# Patient Record
Sex: FEMALE | Race: WHITE | NOT HISPANIC OR LATINO | Employment: OTHER | URBAN - METROPOLITAN AREA
[De-identification: names, ages, dates, MRNs, and addresses within clinical notes are randomized per-mention and may not be internally consistent; named-entity substitution may affect disease eponyms.]

---

## 2024-08-26 DIAGNOSIS — D32.9 MENINGIOMA: Primary | ICD-10-CM

## 2024-09-19 ENCOUNTER — OFFICE VISIT (OUTPATIENT)
Dept: NEUROLOGY | Facility: CLINIC | Age: 79
End: 2024-09-19
Payer: MEDICARE

## 2024-09-19 VITALS
HEIGHT: 63 IN | SYSTOLIC BLOOD PRESSURE: 124 MMHG | WEIGHT: 153 LBS | BODY MASS INDEX: 27.11 KG/M2 | DIASTOLIC BLOOD PRESSURE: 76 MMHG

## 2024-09-19 DIAGNOSIS — R51.9 NONINTRACTABLE EPISODIC HEADACHE, UNSPECIFIED HEADACHE TYPE: ICD-10-CM

## 2024-09-19 DIAGNOSIS — H93.19 TINNITUS, UNSPECIFIED LATERALITY: ICD-10-CM

## 2024-09-19 DIAGNOSIS — R51.9 CHRONIC DAILY HEADACHE: ICD-10-CM

## 2024-09-19 DIAGNOSIS — D32.9 MENINGIOMA: Primary | ICD-10-CM

## 2024-09-19 PROCEDURE — 1159F MED LIST DOCD IN RCRD: CPT | Mod: CPTII,S$GLB,, | Performed by: SPECIALIST

## 2024-09-19 PROCEDURE — 1160F RVW MEDS BY RX/DR IN RCRD: CPT | Mod: CPTII,S$GLB,, | Performed by: SPECIALIST

## 2024-09-19 PROCEDURE — 99999 PR PBB SHADOW E&M-EST. PATIENT-LVL III: CPT | Mod: PBBFAC,,, | Performed by: SPECIALIST

## 2024-09-19 PROCEDURE — 99204 OFFICE O/P NEW MOD 45 MIN: CPT | Mod: S$GLB,,, | Performed by: SPECIALIST

## 2024-09-19 PROCEDURE — 3074F SYST BP LT 130 MM HG: CPT | Mod: CPTII,S$GLB,, | Performed by: SPECIALIST

## 2024-09-19 PROCEDURE — 1101F PT FALLS ASSESS-DOCD LE1/YR: CPT | Mod: CPTII,S$GLB,, | Performed by: SPECIALIST

## 2024-09-19 PROCEDURE — 3288F FALL RISK ASSESSMENT DOCD: CPT | Mod: CPTII,S$GLB,, | Performed by: SPECIALIST

## 2024-09-19 PROCEDURE — 3078F DIAST BP <80 MM HG: CPT | Mod: CPTII,S$GLB,, | Performed by: SPECIALIST

## 2024-09-19 RX ORDER — LEVOTHYROXINE SODIUM 50 UG/1
50 TABLET ORAL EVERY MORNING
COMMUNITY

## 2024-09-19 RX ORDER — ATORVASTATIN CALCIUM 40 MG/1
40 TABLET, FILM COATED ORAL NIGHTLY
COMMUNITY

## 2024-09-19 RX ORDER — PANTOPRAZOLE SODIUM 40 MG/1
40 TABLET, DELAYED RELEASE ORAL DAILY
COMMUNITY

## 2024-09-19 RX ORDER — AMLODIPINE BESYLATE 10 MG/1
10 TABLET ORAL DAILY
COMMUNITY

## 2024-09-19 RX ORDER — LATANOPROST 50 UG/ML
1 SOLUTION/ DROPS OPHTHALMIC NIGHTLY
COMMUNITY

## 2024-09-19 RX ORDER — HYDROCHLOROTHIAZIDE 25 MG/1
25 TABLET ORAL EVERY MORNING
COMMUNITY

## 2024-09-19 NOTE — PROGRESS NOTES
"  Neurology Note    Subjective:       Patient ID: Venita Miranda is a 79 y.o. female.    Chief Complaint: New pt for meningioma eval     HPI:            Pt presents for meningioma eval  Pt reports onset of headaches last year (about 9-10 months ago); current daily frontal "low grade" headaches. Rated 3/10. Intermittent sharp pain in L temporal area lasting few seconds. She occasionally takes Excedrin for headaches and it does help; doesn't take this often. She is not particularly troubled with headaches and states she does not want to take a daily medication as headache prevention.   Also has buzzing in ears when laying down rated as 5/10 (for the past several months). Permanent floaters to R eye after using prednisolone last yr. Last seen by ophthalmology 2 months ago. Denies nausea/vomiting. Occasionally off-balance. No bladder trouble. MRI Brain completed in [place], report under imaging. Lives alone.      ROS: as per HPI, otherwise pertinent systems review is negative          Past Medical History:   Diagnosis Date    Hypertension        Past Surgical History:   Procedure Laterality Date    TUBAL LIGATION         Family History   Family history unknown: Yes       Social History     Socioeconomic History    Marital status: Unknown   Tobacco Use    Smoking status: Never    Smokeless tobacco: Never   Substance and Sexual Activity    Alcohol use: Never    Drug use: Never       Review of patient's allergies indicates:   Allergen Reactions    Lisinopril Hives    Baclofen     Prednisolone acetate        Current Outpatient Medications   Medication Instructions    amLODIPine (NORVASC) 10 mg, Oral, Daily    atorvastatin (LIPITOR) 40 mg, Oral, Nightly    hydroCHLOROthiazide (HYDRODIURIL) 25 mg, Oral, Every morning    latanoprost 0.005 % ophthalmic solution 1 drop, Both Eyes, Nightly    levothyroxine (SYNTHROID) 50 mcg, Oral, Every morning    pantoprazole (PROTONIX) 40 mg, Oral, Daily     Objective:      Exam:   Visit " "Vitals  /76 (BP Location: Left arm, Patient Position: Sitting)   Ht 5' 3" (1.6 m)   Wt 69.4 kg (153 lb)   BMI 27.10 kg/m²       Physical Exam  Constitutional: Appearance: No acute distress, well developed & well nourished  HENT: Head: normocephalic/atraumatic  Mallampati 3   Eyes: Conjunctiva clear, non-icteric  Fundoscopic exam:  no obvious papilledema  Cardiovascular: Regular rate and rhythm, no murmur  Pulmonary:  Pulmonary effort is normal  Musculoskeletal: General: Normal range of motion  Skin: Skin is warm and dry, no obvious lesions  Peripheral extremities: no edema  Psychiatric: mood and affect normal. Behavior normal, cooperative     Neuro exam:    Mental status:  The patient is alert and oriented to person, place and time.  Language is intact and fluent  Normal attention and concentration  Cranial Nerves:  Pupils are equal, round, and reactive to light   EOM intact, no nystagmus, no ptosis, no gaze preference or deviation    Visual fields are full to confrontation testing  Facial movement is symmetric, normal cheek puff and smile  Hearing is intact               Tongue protrudes midline  Coordination:     Finger to nose - normal and symmetric bilaterally  No dysmetria  Motor:  Normal muscle bulk and symmetry  Strength grossly normal  No lateralizing features  Tone:   Normal, no ankle clonus  Gait:  Normal gait, unassisted  Romberg - negative  Tandem, Heel, and Toe Walk - able to perform without difficulty  Sensory:   Intact to vibration in all extremities  Reflexes: + BR, AJ, KJ  Plantars: silent  Tremor: none    Review of Data:   Prior notes reviewed     Imaging:  MRI brain w wo from [facility] 2/24 reviewed.         Assessment/Plan:   1. Management of meningioma    2. Tinnitus, unspecified laterality    3. Chronic daily headache    4. Nonintractable episodic headache, unspecified headache type    Will obtain CD from [facility] to view images.   She does not wish to add a daily preventive headache " medication at this time.     Follow-up: we will call her after viewing images to discuss next steps.     Dr. Dove physically present in exam room during patient encounter.   I, Laila Frausto NP acted solely as a scribe for and in the presence of Dr. Dove who performed the service.    Laila Frausto, MSN, APRN, FNP-C  [facility]  [phone]

## 2025-07-24 ENCOUNTER — HOSPITAL ENCOUNTER (OUTPATIENT)
Dept: RADIOLOGY | Facility: CLINIC | Age: 80
Discharge: HOME | End: 2025-07-24
Attending: ORTHOPAEDIC SURGERY
Payer: MEDICARE

## 2025-07-24 ENCOUNTER — OFFICE VISIT (OUTPATIENT)
Dept: ORTHOPEDICS | Facility: CLINIC | Age: 80
End: 2025-07-24
Payer: MEDICARE

## 2025-07-24 VITALS
WEIGHT: 146.63 LBS | SYSTOLIC BLOOD PRESSURE: 159 MMHG | DIASTOLIC BLOOD PRESSURE: 96 MMHG | HEART RATE: 101 BPM | HEIGHT: 63 IN | BODY MASS INDEX: 25.98 KG/M2

## 2025-07-24 DIAGNOSIS — M25.512 BILATERAL SHOULDER PAIN, UNSPECIFIED CHRONICITY: ICD-10-CM

## 2025-07-24 DIAGNOSIS — M25.511 BILATERAL SHOULDER PAIN, UNSPECIFIED CHRONICITY: ICD-10-CM

## 2025-07-24 DIAGNOSIS — M48.02 CERVICAL SPINAL STENOSIS: Primary | ICD-10-CM

## 2025-07-24 DIAGNOSIS — M54.12 CERVICAL RADICULOPATHY: ICD-10-CM

## 2025-07-24 PROCEDURE — 73030 X-RAY EXAM OF SHOULDER: CPT | Mod: 50,,, | Performed by: ORTHOPAEDIC SURGERY

## 2025-07-24 RX ORDER — BETAMETHASONE SODIUM PHOSPHATE AND BETAMETHASONE ACETATE 3; 3 MG/ML; MG/ML
6 INJECTION, SUSPENSION INTRA-ARTICULAR; INTRALESIONAL; INTRAMUSCULAR; SOFT TISSUE
Status: DISCONTINUED | OUTPATIENT
Start: 2025-07-24 | End: 2025-07-24 | Stop reason: HOSPADM

## 2025-07-24 RX ORDER — LIDOCAINE HYDROCHLORIDE 20 MG/ML
2 INJECTION, SOLUTION INFILTRATION; PERINEURAL
Status: DISCONTINUED | OUTPATIENT
Start: 2025-07-24 | End: 2025-07-24 | Stop reason: HOSPADM

## 2025-07-24 RX ADMIN — BETAMETHASONE SODIUM PHOSPHATE AND BETAMETHASONE ACETATE 6 MG: 3; 3 INJECTION, SUSPENSION INTRA-ARTICULAR; INTRALESIONAL; INTRAMUSCULAR; SOFT TISSUE at 02:07

## 2025-07-24 RX ADMIN — LIDOCAINE HYDROCHLORIDE 2 MG: 20 INJECTION, SOLUTION INFILTRATION; PERINEURAL at 02:07

## 2025-07-24 NOTE — PROCEDURES
Large Joint Aspiration/Injection: R subacromial bursa    Date/Time: 7/24/2025 2:00 PM    Performed by: Rhina Irizarry FNP  Authorized by: Adams Person MD    Timeout: prior to procedure the correct patient, procedure, and site was verified    Prep: patient was prepped and draped in usual sterile fashion      Details:  Needle Size:  25 G  Approach:  Posterior  Location:  Shoulder  Site:  R subacromial bursa  Medications:  2 mg LIDOcaine HCL 20 mg/ml (2%) 20 mg/mL (2 %); 6 mg betamethasone acetate-betamethasone sodium phosphate 6 mg/mL  Patient tolerance:  Patient tolerated the procedure well with no immediate complications

## 2025-07-24 NOTE — PROGRESS NOTES
Subjective:      Patient ID: Venita Miranda is a 79 y.o. female.    Chief Complaint: Shoulder Injury (Ongoing pain for a couple months. Pain radiates from shoulder into elbows. Has tingling at times from shoulders. Is going to PT and noticed pain then. RT shoulder is worse than LT shoulder. Is taking tylenol and alternating with ibuprofen which as helped with pain. Has had an injection years ago in the rt shoulder which helped with the pain. )    HPI:     History of Present Illness    CHIEF COMPLAINT:  - Bilateral shoulder pain with difficulty lifting arms, right side more symptomatic.    HPI:  Venita presents with bilateral shoulder pain that started in May when she began PT. Pain is described as excruciating and radiates down to her upper arms, with associated muscle pain. She reports severe pain radiating to her hands during a specific exercise where she stood in a doorway with her hands raised. Despite the pain, she continued the exercise as instructed but eventually stopped. Pain persists and is now accompanied by difficulty lifting her arms. She experiences pain at night while sleeping, predominantly on the right side.    She has a history of a previous right arm injury from overuse at work, which was treated with an injection and PT, resulting in a full recovery within a few weeks. She also reports a history of neck issues, for which her primary care physician prescribed daily exercises to manage pain in her head. She notes that during recent XRs, raising her arm was particularly painful.    She mentions a history of constant headaches, for which she had previously undergone an MRI of the brain. She denies any numbness or tingling in the arms or hands.    PREVIOUS TREATMENTS:  - PT: Started in May for shoulder issues. One exercise involving standing in a doorway with hands up caused severe pain, but she continued as instructed.  - Injection: Received for right arm pain in the past, followed by PT. This provided  "100% relief within a few weeks.  - Neck exercises: Performed at home daily, which help with head pain.    IMAGING:  - XR Shoulders: Shoulders appeared normal without arthritic changes.  - XR C Spine: Showed significant arthritis.  - MRI Brain: Performed in the past due to constant headaches, but did not include imaging of the neck.    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, which led to overuse injury of the right arm  - She received an injection and PT for this injury, and was back to 100% within a few weeks      ROS:  Head: +headaches  Musculoskeletal: +joint pain, +limb pain, +muscle pain, +nightime pain          Past Medical History:   Diagnosis Date    Hypertension      Past Surgical History:   Procedure Laterality Date    TUBAL LIGATION       Social History[1]    Current Medications[2]  Review of patient's allergies indicates:   Allergen Reactions    Lisinopril Hives    Baclofen        BP (!) 159/96   Pulse 101   Ht 5' 3" (1.6 m)   Wt 66.5 kg (146 lb 9.6 oz)   BMI 25.97 kg/m²     Comprehensive review of systems completed and negative except as per HPI.        Objective:   General: Well-developed, well-nourished.  Neuro: Alert and oriented x 3.  Psych: Normal mood and affect.  Card: Regular rate and rhythm  Resp: Respirations regular and unlabored    Neck Exam:    No deformity. No tenderness to palpation along the spine. No step off. 4/5 strength right upper extremities. 4/5 strength left upper extremity. Positive upper tract signs. Hyper reflexes. Positive clonus. Normal skin appearance. Sensibility normal.      Assessment:         1. Cervical spinal stenosis    2. Cervical radiculopathy          Plan:       Orders Placed This Encounter    Large Joint Aspiration/Injection: R subacromial bursa    X-Ray Shoulder Complete Bilateral        Imaging and exam findings discussed.     Assessment & Plan    PROCEDURES:  - Right shoulder corticosteroid " injection administered today.  This will be therapeutic and diagnostic.  This patient may actually have some shoulder pathology as well.  I just think that based on her x-rays he of the cervical spine she more than likely has cervical issues causing bilateral shoulder pain.    IMAGING:  - Ordered MRI C spine to evaluate for nerve impingement and further assess arthritis.  If her MRI of the cervical spine does not show any significant pathology, then we will investigate the shoulders further with possible MRIs.    FOLLOW UP:  - Follow up after MRI to review results and determine next steps.         Large Joint Aspiration/Injection: R subacromial bursa    Date/Time: 7/24/2025 2:00 PM    Performed by: Rhina Irizarry FNP  Authorized by: Adams Person MD    Timeout: prior to procedure the correct patient, procedure, and site was verified    Prep: patient was prepped and draped in usual sterile fashion      Details:  Needle Size:  25 G  Approach:  Posterior  Location:  Shoulder  Site:  R subacromial bursa  Medications:  2 mg LIDOcaine HCL 20 mg/ml (2%) 20 mg/mL (2 %); 6 mg betamethasone acetate-betamethasone sodium phosphate 6 mg/mL  Patient tolerance:  Patient tolerated the procedure well with no immediate complications       All questions were answered. Patient happy and in agreement with the plan.     This note was generated with the assistance of ambient listening technology. Verbal consent was obtained by the patient and accompanying visitor(s) for the recording of patient appointment to facilitate this note. I attest to having reviewed and edited the generated note for accuracy, though some syntax or spelling errors may persist. Please contact the author of this note for any clarification.         [1]   Social History  Socioeconomic History    Marital status: Unknown   Tobacco Use    Smoking status: Never    Smokeless tobacco: Never   Substance and Sexual Activity    Alcohol use: Never    Drug use: Never      Social Drivers of Health     Financial Resource Strain: Low Risk  (1/17/2025)    [facility]    Overall Financial Resource Strain (CARDIA)     Difficulty of Paying Living Expenses: Not very hard   Food Insecurity: No Food Insecurity (1/17/2025)    [facility]    Hunger Vital Sign     Worried About Running Out of Food in the Last Year: Never true     Ran Out of Food in the Last Year: Never true   Transportation Needs: No Transportation Needs (1/17/2025)    [facility]    PRAPARE - Transportation     Lack of Transportation (Medical): No     Lack of Transportation (Non-Medical): No   Physical Activity: Insufficiently Active (1/17/2025)    [facility]    Exercise Vital Sign     Days of Exercise per Week: 3 days     Minutes of Exercise per Session: 30 min   Stress: No Stress Concern Present (1/17/2025)    [facility]    [place] [facility] [employer] - Occupational Stress Questionnaire     Feeling of Stress : Only a little   Housing Stability: High Risk (1/17/2025)    [facility]    Housing Stability Vital Sign     Unable to Pay for Housing in the Last Year: No     Number of Times Moved in the Last Year: 2     Homeless in the Last Year: No   [2]   Current Outpatient Medications:     amLODIPine (NORVASC) 10 MG tablet, Take 10 mg by mouth once daily., Disp: , Rfl:     atorvastatin (LIPITOR) 40 MG tablet, Take 40 mg by mouth every evening., Disp: , Rfl:     hydroCHLOROthiazide (HYDRODIURIL)  25 MG tablet, Take 25 mg by mouth every morning., Disp: , Rfl:     latanoprost 0.005 % ophthalmic solution, Place 1 drop into both eyes every evening., Disp: , Rfl:     levothyroxine (SYNTHROID) 50 MCG tablet, Take 50 mcg by mouth every morning., Disp: , Rfl:     pantoprazole (PROTONIX) 40 MG tablet, Take 40 mg by mouth once daily., Disp: , Rfl:

## 2025-08-07 ENCOUNTER — OFFICE VISIT (OUTPATIENT)
Dept: ORTHOPEDICS | Facility: CLINIC | Age: 80
End: 2025-08-07
Payer: MEDICARE

## 2025-08-07 VITALS
HEART RATE: 88 BPM | SYSTOLIC BLOOD PRESSURE: 126 MMHG | DIASTOLIC BLOOD PRESSURE: 82 MMHG | BODY MASS INDEX: 25.98 KG/M2 | WEIGHT: 146.63 LBS | HEIGHT: 63 IN

## 2025-08-07 DIAGNOSIS — M54.12 CERVICAL RADICULOPATHY: ICD-10-CM

## 2025-08-07 DIAGNOSIS — M48.02 CERVICAL SPINAL STENOSIS: Primary | ICD-10-CM

## 2025-08-07 RX ORDER — GABAPENTIN 100 MG/1
100 CAPSULE ORAL 3 TIMES DAILY
Qty: 90 CAPSULE | Refills: 2 | Status: SHIPPED | OUTPATIENT
Start: 2025-08-07 | End: 2026-08-07

## 2025-08-07 RX ORDER — MELOXICAM 7.5 MG/1
7.5 TABLET ORAL DAILY
Qty: 30 TABLET | Refills: 2 | Status: SHIPPED | OUTPATIENT
Start: 2025-08-07

## 2025-08-07 NOTE — PROGRESS NOTES
Subjective:      Patient ID: Venita Miradna is a 80 y.o. female.    Chief Complaint: Results (Patient is here for MRI of cervical spine done 8/1/25. )    HPI:     History of Present Illness    CHIEF COMPLAINT:  - C spine issues with neck pain, headaches, and arm symptoms.    HPI:  Venita presents for follow-up regarding C spine issues. She reports neck pain, headaches, and pain in the back of her head for two years. On March 31st, she went to the emergency room due to sudden onset of all symptoms, including tingling and numbness in her hands and feet. She states that the condition is manageable, but occasionally becomes very painful due to the combination of headache, neck pain, and pain in the back of the head. Pain limits her ability to go upstairs. Her shoulders ache but are better than before. She expresses dissatisfaction with previous neurologist visits, stating that the neurologist made no attempt to help her. An MRI of her C spine showed areas of concern correlating with her arm symptoms. She also mentions having reflux.    She denies any tumor causing her symptoms. She denies taking any blood thinners.    PREVIOUS TREATMENTS:  - Neck traction: Initially provided no relief, but caused significant pain in the neck later that evening. She reports no immediate effects, but later that evening, the neck pain became severe.    IMAGING:  - MRI C spine: Findings show disk collapse and bulging, putting pressure on the spinal cord.  - XR C spine: Ordered by the patient's primary care physician.    MEDICATIONS:  - Lidocaine spray: As needed (topical)  - Pantoprazole: For reflux  - Gabapentin or similar medication: Previously used      ROS:  Head: +headaches  Gastrointestinal: +heartburn, +indigestion  Musculoskeletal: +joint pain, +neck pain, +pain with movement, +exercise intolerance  Neurological: +numbness, +tingling          Past Medical History:   Diagnosis Date    Hypertension      Past Surgical History:   Procedure  "Laterality Date    TUBAL LIGATION       Social History[1]    Current Medications[2]  Review of patient's allergies indicates:   Allergen Reactions    Lisinopril Hives    Baclofen        /82   Pulse 88   Ht 5' 3" (1.6 m)   Wt 66.5 kg (146 lb 9.7 oz)   BMI 25.97 kg/m²     Comprehensive review of systems completed and negative except as per HPI.        Objective:   General: Well-developed, well-nourished.  Neuro: Alert and oriented x 3.  Psych: Normal mood and affect.  Card: Regular rate and rhythm  Resp: Respirations regular and unlabored    Exam unchanged from previous visit.      Assessment:         1. Cervical spinal stenosis    2. Cervical radiculopathy          Plan:       Orders Placed This Encounter    Ambulatory referral/consult to Pain Clinic    meloxicam (MOBIC) 7.5 MG tablet    gabapentin (NEURONTIN) 100 MG capsule        Imaging and exam findings discussed.     Assessment & Plan    PROCEDURES:  - Discussed potential future non-operative spine treatments including neck traction and injections.  - Mentioned surgery as a possible future option if non-operative treatments are ineffective.    MEDICATIONS:  - Started Mobic 7.5 mg for arthritis pain.  - Started Neurontin (gabapentin) 100 mg TID for nerve pain.    REFERRALS:  - Referred to Dr. Monique for non-operative spine.    PATIENT INSTRUCTIONS:  - Apply Voltaren gel.               All questions were answered. Patient happy and in agreement with the plan.     This note was generated with the assistance of ambient listening technology. Verbal consent was obtained by the patient and accompanying visitor(s) for the recording of patient appointment to facilitate this note. I attest to having reviewed and edited the generated note for accuracy, though some syntax or spelling errors may persist. Please contact the author of this note for any clarification.         [1]   Social History  Socioeconomic History    Marital status: Unknown   Tobacco Use    " Smoking status: Never    Smokeless tobacco: Never   Substance and Sexual Activity    Alcohol use: Never    Drug use: Never     Social Drivers of Health     Financial Resource Strain: Low Risk  (1/17/2025)    [facility]    Overall Financial Resource Strain (CARDIA)     Difficulty of Paying Living Expenses: Not very hard   Food Insecurity: No Food Insecurity (1/17/2025)    [facility]    Hunger Vital Sign     Worried About Running Out of Food in the Last Year: Never true     Ran Out of Food in the Last Year: Never true   Transportation Needs: No Transportation Needs (1/17/2025)    [facility]    PRAPARE - Transportation     Lack of Transportation (Medical): No     Lack of Transportation (Non-Medical): No   Physical Activity: Insufficiently Active (1/17/2025)    [facility]    Exercise Vital Sign     Days of Exercise per Week: 3 days     Minutes of Exercise per Session: 30 min   Stress: No Stress Concern Present (1/17/2025)    [facility]    [place] of [employer]ational [employer] - Occupational Stress Questionnaire     Feeling of Stress : Only a little   Housing Stability: High Risk (1/17/2025)    [facility]    Housing Stability Vital Sign     Unable to Pay for Housing in the Last Year: No     Number of Times Moved in the Last Year: 2     Homeless in the Last Year: No   [2]   Current Outpatient Medications:     amLODIPine (NORVASC) 10 MG tablet, Take 10 mg by mouth once daily., Disp: , Rfl:      atorvastatin (LIPITOR) 40 MG tablet, Take 40 mg by mouth every evening., Disp: , Rfl:     hydroCHLOROthiazide (HYDRODIURIL) 25 MG tablet, Take 25 mg by mouth every morning., Disp: , Rfl:     latanoprost 0.005 % ophthalmic solution, Place 1 drop into both eyes every evening., Disp: , Rfl:     levothyroxine (SYNTHROID) 50 MCG tablet, Take 50 mcg by mouth every morning., Disp: , Rfl:     pantoprazole (PROTONIX) 40 MG tablet, Take 40 mg by mouth once daily., Disp: , Rfl:     gabapentin (NEURONTIN) 100 MG capsule, Take 1 capsule (100 mg total) by mouth 3 (three) times daily., Disp: 90 capsule, Rfl: 2    meloxicam (MOBIC) 7.5 MG tablet, Take 1 tablet (7.5 mg total) by mouth once daily. take with food, Disp: 30 tablet, Rfl: 2